# Patient Record
(demographics unavailable — no encounter records)

---

## 2025-07-25 NOTE — PHYSICAL EXAM
[General Appearance - In No Acute Distress] : no acute distress [] : no respiratory distress [Normal Station and Gait] : the gait and station were normal for the patient's age [No Focal Deficits] : no focal deficits [Oriented To Time, Place, And Person] : oriented to person, place, and time [Urethral Meatus] : the meatus of the urethra showed no abnormalities [de-identified] : no levator spasms or HTFPD [FreeTextEntry2] : MEGGAN Orantes

## 2025-07-25 NOTE — HISTORY OF PRESENT ILLNESS
[None] : no symptoms [FreeTextEntry1] : 47F presents for initial evaluation of OAB patient referred by Dr. Arriaga  [facility] significant for: nothing PSH significant for: tubal ligation  Significant meds: nothing   Patient reports week-long history of OAB Reports Moderate level of distress  Associated with incomplete bladder emptying, straining Previously treated with nothing Denies incontinence, snoring  Daytime Frequency: >12 Nighttime Frequency: 1-3 Straining to void: yes Subjective Incomplete Emptying:  yes  Daily Fluid Total: 68 ounces Daily Caffeine Total: 32 ounces  Fecal Incontinence: no Neurologic Hx, Vision or Balance changes: no   Reports constipation- fiber intake   PVR:95cc [Straining] : no straining [Dysuria] : no dysuria [Hematuria - Gross] : no gross hematuria

## 2025-07-25 NOTE — ASSESSMENT
[FreeTextEntry1] :  Ms. Reyes presents for initial evaluation of OAB Associated with feeling of incomplete bladder emptying, PVR: 95 cc UA: WNL | Exam negative for HTFPD  Complicated by high caffeine intake (32 oz)  Discussed the risks, benefits, alternatives, and possible side effects of anticholinergic therapy with the patient, including but not limited to dry eyes, dry mouth, constipation and mental status changes. We reviewed literature suggesting increased risk of developing dementia with long-term use and that patient would need to be regularly monitored on the medication.  Recommendaitons -fluid management: limit total to 48 oz, caffeine to 8 oz -start oxybutynin ER 10 -RTC 1 mo   I have spent 45 minutes of time on the encounter which excludes teaching and separately reported services